# Patient Record
Sex: MALE | Race: WHITE
[De-identification: names, ages, dates, MRNs, and addresses within clinical notes are randomized per-mention and may not be internally consistent; named-entity substitution may affect disease eponyms.]

---

## 2018-03-13 ENCOUNTER — HOSPITAL ENCOUNTER (INPATIENT)
Dept: HOSPITAL 25 - ED | Age: 83
LOS: 1 days | DRG: 208 | End: 2018-03-14
Attending: INTERNAL MEDICINE | Admitting: HOSPITALIST
Payer: MEDICARE

## 2018-03-13 DIAGNOSIS — R40.2433: ICD-10-CM

## 2018-03-13 DIAGNOSIS — Z87.891: ICD-10-CM

## 2018-03-13 DIAGNOSIS — G93.1: ICD-10-CM

## 2018-03-13 DIAGNOSIS — Z79.899: ICD-10-CM

## 2018-03-13 DIAGNOSIS — Z88.8: ICD-10-CM

## 2018-03-13 DIAGNOSIS — N17.0: ICD-10-CM

## 2018-03-13 DIAGNOSIS — G40.909: ICD-10-CM

## 2018-03-13 DIAGNOSIS — K21.9: ICD-10-CM

## 2018-03-13 DIAGNOSIS — E87.2: ICD-10-CM

## 2018-03-13 DIAGNOSIS — J69.0: ICD-10-CM

## 2018-03-13 DIAGNOSIS — E87.4: ICD-10-CM

## 2018-03-13 DIAGNOSIS — J96.01: ICD-10-CM

## 2018-03-13 DIAGNOSIS — F32.9: ICD-10-CM

## 2018-03-13 DIAGNOSIS — I46.9: ICD-10-CM

## 2018-03-13 DIAGNOSIS — J44.9: ICD-10-CM

## 2018-03-13 DIAGNOSIS — J96.02: Primary | ICD-10-CM

## 2018-03-13 DIAGNOSIS — I10: ICD-10-CM

## 2018-03-13 DIAGNOSIS — Z79.01: ICD-10-CM

## 2018-03-13 DIAGNOSIS — Z66: ICD-10-CM

## 2018-03-13 DIAGNOSIS — Z95.2: ICD-10-CM

## 2018-03-13 DIAGNOSIS — E87.5: ICD-10-CM

## 2018-03-13 LAB
BASOPHILS # BLD AUTO: 0 10^3/UL (ref 0–0.2)
BASOPHILS # BLD AUTO: 0 10^3/UL (ref 0–0.2)
EOSINOPHIL # BLD AUTO: 0 10^3/UL (ref 0–0.6)
EOSINOPHIL # BLD AUTO: 0 10^3/UL (ref 0–0.6)
HCT VFR BLD AUTO: 45 % (ref 42–52)
HCT VFR BLD AUTO: 47 % (ref 42–52)
HGB BLD-MCNC: 14.7 G/DL (ref 14–18)
HGB BLD-MCNC: 15.7 G/DL (ref 14–18)
INR PPP/BLD: 1.13 (ref 0.77–1.02)
LYMPHOCYTES # BLD AUTO: 0.9 10^3/UL (ref 1–4.8)
LYMPHOCYTES # BLD AUTO: 2 10^3/UL (ref 1–4.8)
MCH RBC QN AUTO: 35 PG (ref 27–31)
MCH RBC QN AUTO: 35 PG (ref 27–31)
MCHC RBC AUTO-ENTMCNC: 33 G/DL (ref 31–36)
MCHC RBC AUTO-ENTMCNC: 33 G/DL (ref 31–36)
MCV RBC AUTO: 106 FL (ref 80–94)
MCV RBC AUTO: 108 FL (ref 80–94)
MONOCYTES # BLD AUTO: 0.8 10^3/UL (ref 0–0.8)
MONOCYTES # BLD AUTO: 1.1 10^3/UL (ref 0–0.8)
NEUTROPHILS # BLD AUTO: 10 10^3/UL (ref 1.5–7.7)
NEUTROPHILS # BLD AUTO: 5.1 10^3/UL (ref 1.5–7.7)
NRBC # BLD AUTO: 0 10^3/UL
NRBC # BLD AUTO: 0 10^3/UL
NRBC BLD QL AUTO: 0.1
NRBC BLD QL AUTO: 0.3
PLATELET # BLD AUTO: 104 10^3/UL (ref 150–450)
PLATELET # BLD AUTO: 112 10^3/UL (ref 150–450)
RBC # BLD AUTO: 4.17 10^6/UL (ref 4–5.4)
RBC # BLD AUTO: 4.43 10^6/UL (ref 4–5.4)
WBC # BLD AUTO: 11.7 10^3/UL (ref 3.5–10.8)
WBC # BLD AUTO: 8.2 10^3/UL (ref 3.5–10.8)

## 2018-03-13 PROCEDURE — 87205 SMEAR GRAM STAIN: CPT

## 2018-03-13 PROCEDURE — 94002 VENT MGMT INPAT INIT DAY: CPT

## 2018-03-13 PROCEDURE — 71045 X-RAY EXAM CHEST 1 VIEW: CPT

## 2018-03-13 PROCEDURE — 85610 PROTHROMBIN TIME: CPT

## 2018-03-13 PROCEDURE — 82550 ASSAY OF CK (CPK): CPT

## 2018-03-13 PROCEDURE — 84484 ASSAY OF TROPONIN QUANT: CPT

## 2018-03-13 PROCEDURE — 87899 AGENT NOS ASSAY W/OPTIC: CPT

## 2018-03-13 PROCEDURE — 70450 CT HEAD/BRAIN W/O DYE: CPT

## 2018-03-13 PROCEDURE — 82553 CREATINE MB FRACTION: CPT

## 2018-03-13 PROCEDURE — 82803 BLOOD GASES ANY COMBINATION: CPT

## 2018-03-13 PROCEDURE — 36600 WITHDRAWAL OF ARTERIAL BLOOD: CPT

## 2018-03-13 PROCEDURE — 36415 COLL VENOUS BLD VENIPUNCTURE: CPT

## 2018-03-13 PROCEDURE — 80076 HEPATIC FUNCTION PANEL: CPT

## 2018-03-13 PROCEDURE — C8929 TTE W OR WO FOL WCON,DOPPLER: HCPCS

## 2018-03-13 PROCEDURE — 82140 ASSAY OF AMMONIA: CPT

## 2018-03-13 PROCEDURE — 84132 ASSAY OF SERUM POTASSIUM: CPT

## 2018-03-13 PROCEDURE — 99285 EMERGENCY DEPT VISIT HI MDM: CPT

## 2018-03-13 PROCEDURE — 95822 EEG COMA OR SLEEP ONLY: CPT

## 2018-03-13 PROCEDURE — 83880 ASSAY OF NATRIURETIC PEPTIDE: CPT

## 2018-03-13 PROCEDURE — 85730 THROMBOPLASTIN TIME PARTIAL: CPT

## 2018-03-13 PROCEDURE — 85025 COMPLETE CBC W/AUTO DIFF WBC: CPT

## 2018-03-13 PROCEDURE — 87641 MR-STAPH DNA AMP PROBE: CPT

## 2018-03-13 PROCEDURE — 93306 TTE W/DOPPLER COMPLETE: CPT

## 2018-03-13 PROCEDURE — 87077 CULTURE AEROBIC IDENTIFY: CPT

## 2018-03-13 PROCEDURE — 83735 ASSAY OF MAGNESIUM: CPT

## 2018-03-13 PROCEDURE — 93005 ELECTROCARDIOGRAM TRACING: CPT

## 2018-03-13 PROCEDURE — 83605 ASSAY OF LACTIC ACID: CPT

## 2018-03-13 PROCEDURE — 80053 COMPREHEN METABOLIC PANEL: CPT

## 2018-03-13 PROCEDURE — 84100 ASSAY OF PHOSPHORUS: CPT

## 2018-03-13 PROCEDURE — 80048 BASIC METABOLIC PNL TOTAL CA: CPT

## 2018-03-13 PROCEDURE — 5A1945Z RESPIRATORY VENTILATION, 24-96 CONSECUTIVE HOURS: ICD-10-PCS | Performed by: INTERNAL MEDICINE

## 2018-03-13 PROCEDURE — 87502 INFLUENZA DNA AMP PROBE: CPT

## 2018-03-13 PROCEDURE — 87040 BLOOD CULTURE FOR BACTERIA: CPT

## 2018-03-13 PROCEDURE — 94003 VENT MGMT INPAT SUBQ DAY: CPT

## 2018-03-13 RX ADMIN — ACETAMINOPHEN PRN MG: 650 SUPPOSITORY RECTAL at 17:22

## 2018-03-13 RX ADMIN — HEPARIN SODIUM SCH UNITS: 5000 INJECTION INTRAVENOUS; SUBCUTANEOUS at 13:45

## 2018-03-13 RX ADMIN — CHLORHEXIDINE GLUCONATE 0.12% ORAL RINSE SCH: 1.2 LIQUID ORAL at 23:34

## 2018-03-13 RX ADMIN — CHLORHEXIDINE GLUCONATE 0.12% ORAL RINSE SCH ML: 1.2 LIQUID ORAL at 23:53

## 2018-03-13 RX ADMIN — SODIUM CHLORIDE SCH MLS/HR: 900 IRRIGANT IRRIGATION at 23:54

## 2018-03-13 RX ADMIN — PIPERACILLIN AND TAZOBACTAM SCH MLS/HR: 3; .375 INJECTION, POWDER, LYOPHILIZED, FOR SOLUTION INTRAVENOUS; PARENTERAL at 11:51

## 2018-03-13 RX ADMIN — HEPARIN SODIUM SCH UNITS: 5000 INJECTION INTRAVENOUS; SUBCUTANEOUS at 23:53

## 2018-03-13 RX ADMIN — CHLORHEXIDINE GLUCONATE 0.12% ORAL RINSE SCH ML: 1.2 LIQUID ORAL at 08:58

## 2018-03-13 RX ADMIN — CHLORHEXIDINE GLUCONATE 0.12% ORAL RINSE SCH ML: 1.2 LIQUID ORAL at 13:45

## 2018-03-13 RX ADMIN — PANTOPRAZOLE SODIUM SCH MG: 40 INJECTION, POWDER, FOR SOLUTION INTRAVENOUS at 08:58

## 2018-03-13 RX ADMIN — ACETAMINOPHEN PRN MG: 650 SUPPOSITORY RECTAL at 11:54

## 2018-03-13 RX ADMIN — SODIUM CHLORIDE SCH MLS/HR: 900 IRRIGANT IRRIGATION at 13:55

## 2018-03-13 RX ADMIN — SODIUM CHLORIDE SCH MLS/HR: 900 IRRIGANT IRRIGATION at 06:17

## 2018-03-13 RX ADMIN — CHLORHEXIDINE GLUCONATE 0.12% ORAL RINSE SCH ML: 1.2 LIQUID ORAL at 17:22

## 2018-03-13 NOTE — RAD
INDICATION: Short of breath     



COMPARISON: None

 

TECHNIQUE: An AP portable view obtained at 0334 hours is submitted.



FINDINGS: 



Bones/Soft Tissues: There are no acute bony findings. There is an endotracheal tube in

satisfactory position There is sternotomy. There are overlying external pacemaker pads



Cardiomediastinal: The correct silhouette is enlarged.. 



Lungs: There is interstitial and alveolar infiltrative change which may represent

pulmonary edema.



Pleura: There is a left-sided pleural effusion.. 



Other: None



IMPRESSION:  ENDOTRACHEAL TUBE IN SATISFACTORY POSITION. SUSPECT PULMONARY EDEMA.

## 2018-03-13 NOTE — ECHO
Patient:      LATANYA STEWART

Med Rec#:     [id number]            :          1934          

Date:         2018            Age:          83y                 

Account#:     [id number]          Height:       193.04 cm / 76.0 in

Accession#:   [id number]           Weight:       129.73 kg / 285.9 lbs

Sex:          M                     BSA:          2.58

Room#:        [facility]                

Admit Date#:  2018          

Type:         Inpatient

 

Referring:    Fred Frankenberg,MD

Reading:      Jeramie Perez MD

Sonographer:  Magalys Milton RDCS

CC:           Joni Siegel MD

______________________________________________________________________

 

Transthoracic Echocardiogram

 

Indication:

Cardiac arrest, history of tissue AVR and MVR

BP:           180/101

HR:           83

Rhythm:       NSR

 

Findings     

History:

Seizure disorder, s/p bioprosthetic AVR/MVR, HTN, GERD, ROGELIO with CPAP.

Patient was sedated, intubated and mechanically ventillated during the

study.  

 

Technical Comments:

The study is technically difficult.  The study is technically limited

due to poor acoustic windows.  The study is technically limited due to

patient being intubated and on a ventilator. Completed at 0900. 

 

Left Ventricle:

The left ventricular chamber size is normal. Mild concentric left

ventricular hypertrophy is observed. Global left ventricular wall motion

and contractility are within normal limits. There is normal left

ventricular systolic function. The estimated ejection fraction is

60-65%. Contrast used to improve assessment. Abnormal left ventricular

diastolic function is observed. Abnormal left ventricular diastolic

filling is observed, consistent with impaired relaxation.  The patient

was unable to perform a Valsalva maneuver. 

 

Left Atrium:

The left atrium is not well visualized. 

 

Right Ventricle:

The right ventricle is not well visualized. 

 

Right Atrium:

The right atrium is not well visualized. 

 

Aortic Valve:

The aortic valve structure is not well visualized. A bio-prosthetic

aortic valve is present. Accurate assessment of valvualr function is

suboptimal due to poor images. 

 

Mitral Valve:

The mitral valve structure is not well visualized. A bioprosthetic

mitral valve is present.  The bioprosthetic mitral valve appears to be

functioning normally.  

 

Tricuspid Valve:

The tricuspid valve structure is not well visualized. There is trace

tricuspid regurgitation.  Unable to estimate the right ventricular

systolic pressure.   There is no tricuspid stenosis. 

 

Pulmonic Valve:

The pulmonic valve structure is not well visualized. There is no

pulmonic stenosis.  

 

Pericardium:

There is no significant pericardial effusion. 

 

Aorta:

There is no dilatation of the ascending aorta. There is no dilatation of

the aortic arch. The aortic root is normal in size. 

 

Pulmonary Artery:

The main pulmonary artery is not well visualized. 

 

Venous:

Unable to accurately comment on the size  collapsibility of the IVC as

the patient in known to be on mechanical ventilation. 

 

Contrast:

Definity was used to optimize study.  4 mL of diluted Definity was

utilized. Intravenous contrast was used to enhance endocardial border

definition. 

 

Conclusions

The study is technically difficult. 

The study is technically limited due to poor acoustic windows.

Accurate valvular assessment is suboptimal due to poor quality images. 

The study is technically limited due to patient being intubated and on a

ventilator. 

Mild concentric left ventricular hypertrophy is observed.

Global left ventricular wall motion and contractility are within normal

limits.

The estimated ejection fraction is 60-65%. Contrast used to improve

assessment.

There is trace tricuspid regurgitation. 

Unable to estimate the right ventricular systolic pressure.  

No reports of prior studies are offered for comparison.

 

Measurements     

Name                    Value         Normal Range            

RVIDd (AP) 2D           4 cm          (0.9 - 2.6)             

RVDdMajor (2D)          5.5 cm        (2.2 - 4.4)             

RAd ISD 4CH             7.3 cm        (3.4 - 4.9)             

RA (A4C)W               5.3 cm        (2.9 - 4.6)             

IVSd (2D)               1.3 cm        (0.6 - 1)               

LVPWd (2D)              1.2 cm        (0.6 - 1)               

LVIDd (2D)              4.4 cm        (3.6 - 5.4)             

LVIDs (2D)              3.3 cm        -                        

LV FS (2D)              25 %          (25 - 45)               

Aortic Annulus          2 cm          (1.4 - 2.6)             

Ao root diameter (2D)   3.1 cm        (2.1 - 3.5)             

Ascending Ao            3 cm          (2.1 - 3.4)             

Aortic arch             2.8 cm        (1.8 - 3.4)             

LA dimension (AP) 2D    3.6 cm        (2.3 - 3.8)             

LAd ISD 4CH             7.1 cm        (2.9 - 5.3)             

LA ISD 4CH W            5.7 cm        (2.5 - 4.5)             

 

Name                    Value         Normal Range            

LA ESV SP 4CH (A/L)     133 ml        -                        

LA ESV SP 2CH (A/L)     148 ml        -                        

LA ESV BP (A/L)         143 ml        -                        

LA ESV BP (A/L) index   55 ml/m2      -                        

LA ESV SP 4CH (MOD)     125 ml        -                        

LA ESV SP 2CH (MOD)     142 ml        -                        

 

Name                    Value         Normal Range            

MV E-wave Vmax          1.29 m/sec    -                        

MV deceleration time    183.56 msec   -                        

MV A-wave Vmax          1.55 m/sec    -                        

MV E:A ratio            0.83 ratio    -                        

LV septal e' Vmax       0.06 m/sec    -                        

LV lateral e' Vmax      0.1 m/sec     -                        

LV E:e' septal ratio    21.5 ratio    -                        

LV E:e' lateral ratio   12.9 ratio    -                        

 

Name                    Value         Normal Range            

AV Vmax                 0.91 m/sec    -                        

AV VTI                  24.3 cm       -                        

AV peak gradient        3.35 mmHg     -                        

AV mean gradient        2.02 mmHg     -                        

LVOT Vmax               0.81 m/sec    -                        

LVOT VTI                16.9 cm       -                        

LVOT peak gradient      2.64 mmHg     -                        

LVOT mean gradient      1.35 mmHg     -                        

RU Vmax                0.8 m/sec     -                        

 

Name                    Value         Normal Range            

MV Vmax                 1.9 m/sec     -                        

MV VTI                  41.3 cm       -                        

MV peak gradient        14.44 mmHg    -                        

MV mean gradient        7.78 mmHg     -                        

MV PHT                  100.7 msec    -                        

MVA (PHT)               2.18 cm2      -                        

 

Name                    Value         Normal Range            

TR Vmax                 2.5 m/sec     -                        

TR peak gradient        25 mmHg       -                        

 

Name                    Value         Normal Range            

PV Vmax                 0.8 m/sec     -                        

PV peak gradient        2.58 mmHg     -                        

 

Electronically signed by: Jeramie Perez MD on 2018 09:38:32

## 2018-03-13 NOTE — ED
Maribel ZHANG Julia, scribed for Gregg West MD on 03/13/18 at 0310 .





Cardiac Resuscitation





- HPI Summary


HPI Summary: 





This patient is a 83 year old M BIBA to [facility] with a down time of 1 hour prior 

to receiving care by EMS. EMS reports patient was asystole at first but then V-

fib. EMS treatment of six rounds of Epinephrine, one of Sodium bicarbonate, and 

two rounds of Narcan. EMS intubated patient with a 7.0 tube.





- History of Current Complaint


Stated Complaint: CARDIAC ISSUE


Time Seen by Provider: 03/13/18 02:59


Hx Obtained From: EMS


Hx From Patient Unobtainable Due To: Other - Patient is unresponsive


Down-time Before Basic Life Support Initiated: Down-time before BLS initiated: 

- one hour





- Allergies/Home Medications


Allergies/Adverse Reactions: 


 Allergies











Allergy/AdvReac Type Severity Reaction Status Date / Time


 


MS Hydrochlorothiazide Allergy Intermediate Unknown Unverified 04/16/14 08:48





[Hydrochlorothiazide]   Reaction  





   Details  














- Past Medical History


Past Medical History: Other: - COPD, skin cancer, enlarged prostate, stomach 

anurysm, CHF, valve replacement HTN, atrial flutter, seizures, depression, 

cataract extraction. Obtained by Medical Records





- Family History


Family History: Unobtainable Due to Extremis





- Social History


Social History: Unobtainable Due to Extremis





- Review of Systems


Review of Systems: Unobtainable Due to Extremis





Physical Examination





- Summary


Physical Exam Summary: 





VITAL SIGNS: Reviewed.


GENERAL:  Patient is a well-developed and nourished male who is lying in the 

stretcher. 


HEAD AND FACE: No signs of trauma. No ecchymosis, hematomas or skull 

depressions. 


EYES: No corneal reflex. Pupils fixated and dilated.


EARS: Ears grossly intact. 


MOUTH: Patient is intubated


NECK: Supple, trachea is midline, no adenopathy, no JVD, no carotid bruit, 


CHEST: Symmetric, 


LUNGS: Clear to auscultation bilaterally. No wheezing or crackles.


CVS: Regular rate and rhythm, S1 and S2 present, no murmurs or gallops 

appreciated.


ABDOMEN: Soft. Abdominal distention. No rebound no guarding, and no masses 

palpated. Bowel sounds are normal.


EXTREMITIES:, no edema, no cyanosis or clubbing. IO in R leg.


NEURO: No acute neurological deficits. 


SKIN: Dry and warm








Procedures





- Procedure Summary


Procedure Summary: 





Post intubation ABG, at 04:10, due to respiratory acidosis. Ventilation 

settings changed to AC [id number]. Respiratory rate of 20. SaO2 60%. 





Diagnostics





- Laboratory


Result Diagrams: 


 03/13/18 03:00





 03/13/18 03:00


Lab Statement: Any lab studies that have been ordered have been reviewed, and 

results considered in the medical decision making process.





- Radiology


  ** CXR


Radiology Interpretation Completed By: ED Physician - Pumonary Edema. PNA over 

R lung.





- EKG


  ** 0342


Cardiac Rate: NL


EKG Rhythm: Sinus Rhythm - at 77 BPM


EKG Interpretation: First degree AV block, nonspecific T wave changes in the 

inferior leads





Cardiac Resus. Course/Dx





- Course


Course Of Treatment: Pako is brought in by EMS with a down time of 1 hour 

prior to receiving care by EMS. EMS reports patient was asystole at first but 

then V-fib. EMS treatment of six rounds of Epinephrine, one of Sodium 

bicarbonate, and two rounds of Narcan. EMS intubated patient with a 7.0 tube. 

Patient given second round of sodium bicarbonate in the ED. Patient is given 

Piperacillin and Vancomycin. CXR reveals pulmonary edema and PNA in the right 

lung. Bloodwork reveals troponin of 0.06 and lactic acid of 9.2. ABG reveals ph 

of 6.96 and pCO2 of 93. Idalmis is admitted by Dr. Frankenberg.





- Diagnoses


Provider Diagnoses: 


 CHF (congestive heart failure), PNA (pneumonia), Cardiac arrest








- Provider Notifications


Discussed Care Of Patient With: Fred Frankenberg - hospitalist


Time Discussed With Above Provider: 03:09


Instructed by Provider To: Admit As Inpatient





- Critical Care Time


Critical Care Time: 30-74 min





Discharge





- Discharge Plan


Condition: Critical


Disposition: ADMITTED TO [facility]


Referrals: 


Joni Siegel MD [Primary Care Provider] - 





The documentation as recorded by the Maribel britt Julia accurately reflects 

the service I personally performed and the decisions made by me, Gregg West MD.

## 2018-03-13 NOTE — RAD
Indication: Cardiac arrest. Question anoxic brain injury.



Comparison: No relevant prior exams available on the [facility] PACS for comparison.



Technique: Noncontrast CT vertex of skull through foramen magnum.



Report: Mild motion artifact. Unremarkable cerebral sulci, ventricles, and basal cisterns.

Decreased density in the periventricular and subcortical white matter while non-specific

is most likely due to chronic microangiopathy. Negative for gray matter white matter

obscuration, intra or extra-axial hemorrhage, or mass effect.



Negative for calvarial or skull base fracture. Fluid level with gas bubbles at the RIGHT

maxillary sinus. Clear mastoid air spaces.



Negative for scalp hematoma.



IMPRESSION: 

1. No acute intracranial process evident.

2. Decreased density in the periventricular and subcortical white matter while

non-specific is most likely due to chronic microangiopathy. 

3. Correlate for potential acute RIGHT maxillary sinusitis.

## 2018-03-13 NOTE — HP
H&P (Free Text)


History and Physical: 





PCP: DAYSI Siegel MD





Date/Time: 03/13/2018 0400





CC: out of hospital cardiac arrest





HPI: Mr Murray is an 82YO male who experienced an out-of-hospital arrest 

in his home. Per his wife, they were arguing at ~0100 when he got up, went to 

the kitchen briefly, and returned to sit on the edge of the bed. He then slid 

off the bed and was unable to get up even with her assistance. She gave him a 

pillow and was planning on letting him sleep in the floor until she could get 

more help in the morning. She did call her daughter who arrived about an hour 

after he slid off the bed. She found him unresponsive and 'blue' and called 

EMS. EMS arrived and performed ACLS protocols for ~40 minutes, reportedly with 

unsustained ROSC multiple times. They called medical control at [facility] who 

advised cessation. They then administered naloxone obtained sustained ROSC and 

was able to transport. At this time, Mr Murray is intubated and unable to 

give any history. He is on no sedatives and tolerating the vent well. No 

corneal or pupillary reflex.





Of note, over the past 2 weeks Mr Murray has developed new onset of 

recurrent throbbing headaches of which he was suffering tonight prior to the 

event. Family seems at times realistic of his prognosis, but then alludes to 

the fact he was in a coma for 4 weeks following one of his valve replacements. 

They do agree to DNR status at this time & a MOLST to that effect was filled 

out.





PMedHx


seizure disorder


tissue AVR/MVR


HTN


GERD


depression





Ambulatory Orders


Nursing to reconcile.





Amiodarone TAB* [Cordarone Tab*] 200 mg PO DAILY 04/03/14 


Carvedilol TAB* [Coreg TAB*] 3.125 mg PO BID 04/03/14 


Furosemide TAB* [Lasix TAB*] 20 mg PO DAILY PRN 04/03/14 


Losartan Potassium 50 mg PO DAILY 04/03/14 


Omeprazole CAP* [Prilosec CAP*] 40 mg PO DAILY 04/03/14 


Sertraline* [Zoloft*] 100 mg PO DAILY 04/03/14 


Spironolactone TAB* [Aldactone TAB*] 25 mg PO DAILY 04/03/14 


Warfarin TAB(*) [Coumadin TAB(*)] 12 mg PO 0700 04/03/14 


carBAMazepine TAB(*) [Tegretol TAB(*)] 200 mg PO BID 04/03/14 





Allergies


MS Hydrochlorothiazide [Hydrochlorothiazide] Allergy (Intermediate, Unverified 

04/16/14 08:48)


 Unknown Reaction Details


 UNKNOWN 





PSurgHx


tissue AVR/MVR


OU cat


TURP





SocHx: quit >50 years ago, 3-4 alcoholic drinks daily, no recreational drugs; 

lives with his wife; retired ; DNR code status





FamHx: reviewed & non-contributory to presentation





ROS: as above, otherwise reviewed and all were negative





vitals: 


 Vital Signs











Temp  36.4 C   03/13/18 02:55


 


Pulse  76   03/13/18 02:55


 


Resp  16   03/13/18 02:55


 


BP  121/80   03/13/18 02:55


 


Pulse Ox  100   03/13/18 02:55








Constitutional: NAD, normally developed, obese elderly white male


HEENM: atraumatic; sclera/conjunctiva: anicteric/clear; blephara: normal; 

hearing: unable to assess; oropharynx: intubated, mucosa tacky


Neck: soft tissue: non-tender, no mass; thyroid: normal


Pulmonary: clear to auscultation bilaterally, good aeration, no accessory 

muscle use


CV: RR/RR, normal S1S2, no carotid bruit, no jugular venous distention, 2+ B DP/

PT, no edema


Abdominal: soft, non-distended, non-tender, no rebound/guarding/rigidity, 

normoactive bowel sounds, no hepatosplenomegaly or masses, no costovertebral 

angle tenderness


Musculoskeletal: general: grossly intact, non-tender to palpation; gait: 

currently non-ambulatory


Integumental: normal appearance and texture of exposed skin for age





Neurological


cranial nerves


II: unable to assess visual fields


III/IV/VI: light reflex, pupils 3mm/fixed B


V: absent corneal reflex


VII: intact facial symmetry


VIII: unable to assess hearing


IX/X: absent gag reflex


XII: unable to assess tongue protrusion or voice articulation 





motor


no spontaneous movement, no response to noxious stimuli





coordination 


finger/nose: unable to assess


heal/shin: unable to assess


dysdiadochokinesia: unable to assess





sensory 


crude touch: no response to noxious stimuli


pinprick: unable to assess


vibration: unable to assess


proprioception: unable to assess





Psychiatric 


orientation: GCS 3


affect: flat


mood: acquiescent


eye contact: absent


content: absent


responses: absent


insight: absent





Testing: 


 Lab Results











  03/13/18 03/13/18 03/13/18 Range/Units





  03:00 03:00 03:00 


 


WBC   8.2   (3.5-10.8)  10^3/ul


 


RBC   4.17   (4.0-5.4)  10^6/ul


 


Hgb   14.7   (14.0-18.0)  g/dl


 


Hct   45   (42-52)  %


 


MCV   108 H   (80-94)  fL


 


MCH   35 H   (27-31)  pg


 


MCHC   33   (31-36)  g/dl


 


RDW   14   (10.5-15)  %


 


Plt Count   104 L   (150-450)  10^3/ul


 


MPV   10   (7.4-10.4)  um3


 


Neut % (Auto)   61.8   (38-83)  %


 


Lymph % (Auto)   23.8 L   (25-47)  %


 


Mono % (Auto)   13.5 H   (0-7)  %


 


Eos % (Auto)   0.5   (0-6)  %


 


Baso % (Auto)   0.4   (0-2)  %


 


Absolute Neuts (auto)   5.1   (1.5-7.7)  10^3/ul


 


Absolute Lymphs (auto)   2.0   (1.0-4.8)  10^3/ul


 


Absolute Monos (auto)   1.1 H   (0-0.8)  10^3/ul


 


Absolute Eos (auto)   0   (0-0.6)  10^3/ul


 


Absolute Basos (auto)   0   (0-0.2)  10^3/ul


 


Absolute Nucleated RBC   0   10^3/ul


 


Nucleated RBC %   0.1   


 


Macrocytosis   1+   


 


INR (Anticoag Therapy)     (0.77-1.02)  


 


Patient Temperature     


 


ABG pH     (7.35-7.45)  


 


ABG pH (Temp Correct)     


 


ABG pCO2     (35-45)  mmHg


 


ABG pCO2 (Temp Corrct     


 


ABG pO2     ()  mmHg


 


ABG pO2 (Temp Correct     


 


ABG HCO3     (19-31)  mmol/L


 


ABG O2 Saturation     (95-98)  %


 


ABG Base Excess     (-2.0-2.0)  


 


Respiration Rate     


 


O2 Delivery Device     


 


Ventilator Type     


 


Vent Mode     


 


FiO2     


 


Inspiratory Time     


 


PEEP     


 


Pressure Support     


 


Pressure Control     


 


EPAP     


 


IPAP     


 


BiPAP     


 


Sodium    134  (133-145)  mmol/L


 


Potassium    4.2  (3.5-5.0)  mmol/L


 


Chloride    98 L  (101-111)  mmol/L


 


Carbon Dioxide    21 L  (22-32)  mmol/L


 


Anion Gap    15 H  (2-11)  mmol/L


 


BUN    14  (6-24)  mg/dL


 


Creatinine    1.56 H  (0.67-1.17)  mg/dL


 


Est GFR ( Amer)    54.9  (>60)  


 


Est GFR (Non-Af Amer)    42.7  (>60)  


 


BUN/Creatinine Ratio    9.0  (8-20)  


 


Glucose    179 H  ()  mg/dL


 


Lactic Acid     (0.5-2.0)  mmol/L


 


Calcium    9.0  (8.6-10.3)  mg/dL


 


Magnesium    2.6  (1.9-2.7)  mg/dL


 


Total Bilirubin    0.60  (0.2-1.0)  mg/dL


 


AST    68 H  (13-39)  U/L


 


ALT    43  (7-52)  U/L


 


Alkaline Phosphatase    117 H  ()  U/L


 


Total Creatine Kinase    242 H  ()  U/L


 


CK-MB (CK-2)    3.1  (0.6-6.3)  ng/mL


 


Troponin I    0.06 H*  (<0.04)  ng/mL


 


B-Natriuretic Peptide  534 H   ( - 100) pg/mL


 


Total Protein    7.3  (6.4-8.9)  g/dL


 


Albumin    3.6  (3.2-5.2)  g/dL


 


Globulin    3.7  (2-4)  g/dL


 


Albumin/Globulin Ratio    1.0  (1-3)  














  03/13/18 03/13/18 03/13/18 Range/Units





  03:00 03:00 03:40 


 


WBC     (3.5-10.8)  10^3/ul


 


RBC     (4.0-5.4)  10^6/ul


 


Hgb     (14.0-18.0)  g/dl


 


Hct     (42-52)  %


 


MCV     (80-94)  fL


 


MCH     (27-31)  pg


 


MCHC     (31-36)  g/dl


 


RDW     (10.5-15)  %


 


Plt Count     (150-450)  10^3/ul


 


MPV     (7.4-10.4)  um3


 


Neut % (Auto)     (38-83)  %


 


Lymph % (Auto)     (25-47)  %


 


Mono % (Auto)     (0-7)  %


 


Eos % (Auto)     (0-6)  %


 


Baso % (Auto)     (0-2)  %


 


Absolute Neuts (auto)     (1.5-7.7)  10^3/ul


 


Absolute Lymphs (auto)     (1.0-4.8)  10^3/ul


 


Absolute Monos (auto)     (0-0.8)  10^3/ul


 


Absolute Eos (auto)     (0-0.6)  10^3/ul


 


Absolute Basos (auto)     (0-0.2)  10^3/ul


 


Absolute Nucleated RBC     10^3/ul


 


Nucleated RBC %     


 


Macrocytosis     


 


INR (Anticoag Therapy)   1.13 H   (0.77-1.02)  


 


Patient Temperature    Not Reportable  


 


ABG pH    6.96 L*  (7.35-7.45)  


 


ABG pH (Temp Correct)    Not Reportable  


 


ABG pCO2    93 H*  (35-45)  mmHg


 


ABG pCO2 (Temp Corrct    Not Reportable  


 


ABG pO2    182 H  ()  mmHg


 


ABG pO2 (Temp Correct    Not Reportable  


 


ABG HCO3    14.5 L  (19-31)  mmol/L


 


ABG O2 Saturation    99.4 H  (95-98)  %


 


ABG Base Excess    -13.3 L  (-2.0-2.0)  


 


Respiration Rate    16  


 


O2 Delivery Device    Vent  


 


Ventilator Type    400  


 


Vent Mode    Cmv  


 


FiO2    100  


 


Inspiratory Time    Not Reportable  


 


PEEP    5  


 


Pressure Support    Not Reportable  


 


Pressure Control    Not Reportable  


 


EPAP    Not Reportable  


 


IPAP    Not Reportable  


 


BiPAP    Not Reportable  


 


Sodium     (133-145)  mmol/L


 


Potassium     (3.5-5.0)  mmol/L


 


Chloride     (101-111)  mmol/L


 


Carbon Dioxide     (22-32)  mmol/L


 


Anion Gap     (2-11)  mmol/L


 


BUN     (6-24)  mg/dL


 


Creatinine     (0.67-1.17)  mg/dL


 


Est GFR ( Amer)     (>60)  


 


Est GFR (Non-Af Amer)     (>60)  


 


BUN/Creatinine Ratio     (8-20)  


 


Glucose     ()  mg/dL


 


Lactic Acid  9.2 H*    (0.5-2.0)  mmol/L


 


Calcium     (8.6-10.3)  mg/dL


 


Magnesium     (1.9-2.7)  mg/dL


 


Total Bilirubin     (0.2-1.0)  mg/dL


 


AST     (13-39)  U/L


 


ALT     (7-52)  U/L


 


Alkaline Phosphatase     ()  U/L


 


Total Creatine Kinase     ()  U/L


 


CK-MB (CK-2)     (0.6-6.3)  ng/mL


 


Troponin I     (<0.04)  ng/mL


 


B-Natriuretic Peptide    ( - 100) pg/mL


 


Total Protein     (6.4-8.9)  g/dL


 


Albumin     (3.2-5.2)  g/dL


 


Globulin     (2-4)  g/dL


 


Albumin/Globulin Ratio     (1-3)  








ECG, personally reviewed: NSR rate 77, no ischemia





CXR, personally reviewed: R-sided white out & LLL infiltrate, suspect 

aspiration as R>>L vs pulmonary contusion 2nd prolonged CPR





CT brain WO, personally reviewed: reported as negative





Impression: 83M presenting with out-of-hospital arrest of uncertain etiology 

with ~40minutes of ACLS prior to achieving sustained ROSC





DIAGNOSIS & PLAN


Primary 


out-of-hospital arrest


: ICU


: mechanical ventilation


: sedation, as indicated/needed


: KAMLA Gibbs MD critical care apprised, agreed with plan of care


: prognosis poor, family aware


: supportive care





suspect anoxic brain injury


: seizure precautions


: recheck CT brain WO 24-48h





aspiration pneumonia vs pulmonary contusion 2nd prolonged CPR


: IV piperacillin/tazobactam


: blood & sputum CXs





Secondary 


seizure disorder


: review meds once reconciled





tissue AVR/MVR


: no acute issues





HTN


: review meds once reconciled





GERD


: IV pantoprazole





depression


: review meds once reconciled





Admission Rational: inpatient for ICU monitoring and management of patient at 

very high risk of morbidity/mortality; inappropriate for outpatient setting


DVTp: SCDs & heparin SQ (if no ICH)


Code Status: DNR, MOLST filled out


HCP: wife

## 2018-03-13 NOTE — PN
Progress Note





- Progress Note


Date of Service: 03/13/18


Note: 





CRITICAL CARE MEDICINE


Date: 3/13/18  Time: 1210





SUBJECTIVE: Patient seen and examined. family updated at bedside. 





PHYSICAL EXAM:


Vital Signs: Reviewed. 


Neurologic: no response to pain, verbal. nonreactive pupils, fixed. no 

corneals. mild cough. overbreathing vent. 


HEENT: pupils equal. Sclera anicteric. Trachea midline. 7.0 ett


Cardiovascular: distant, S1 S2, scar


Respiratory: coarse rales L>R bl; rib fx. no crepitance other then ribs. 


Abdomen: Soft, obese, nt. 


Extremities: cold. cyanotic distally LE


Access: IO, piv





LABS: Reviewed.


IMAGING: Reviewed.


MEDICATIONS: Reviewed.





ASSESSMENT: 83 M


OHCA - significant down time


concern for anoxic brain injury


coma on admission


Severe metabolic and resp acidosis on admission


lactic acidosis on admission


hyperkalemia


h/o valvular heart dz; chronic anticoagulation





PLAN:


Neurologic: concern for anoxic brain injury. support perfusion. can check eeg 

on off chance for seizures and eval baseline in this acute phase of post 

cardiac arrest coma.  time. TTM 36C for first 72hrs. outcome looking poor but 

time at present. 


Cardiovascular: bp holding without pressors. IVF. echo looked ok. 


Respiratory: ? if primary resp arrest. cxr with diastolic failure +/- 

aspiration and ali. almost flail chest now. changed to aprv to see if we can 

recruit as on 100% apv. wean O2 to maintain sat >94%. 


Gastrointestinal: ogt. sup. hold off tf for now. f/u lfts


Renal/Metabolic: acidosis improving. crystalloids. f/u uout. k ok and coming 

down post acid load. 


Infectious Disease: on zosyn for now and continued.


Hematology: stable. hsq


Endocrine: doubt steroids would benefit and can hold off for now. f/u bg. 


Musculoskeletal: f/u chronic le stasis and skin care


Psych/Social: d/w pts wife and daughter at bedside. They expressed good 

understanding. they even had asked ems if they could let him go. Explained 

support plan at the moment. eeg purpose. could consider mri down the line but 

see if his clinical status can guide us.  


if pt unable to improve, or certainly if worsening, DNR and no real escalation 

in care and can consider withdrawal. 





Supportive and preventative care as ordered.


SUP: ppi


VTE prophylaxis: heparin


Ruiz catheter given critical illness, monitoring needs for accurate assessment 

of MIGUEL ANGEL and KDIGO criteria for critically ill patients and to avoid potential 

harms of urinary retention, skin breakdown/ulcers.


Disposition: ICU


Code Status: DNR


Critical Care Time: 45min





ROMMEL Gibbs DO

## 2018-03-14 VITALS — SYSTOLIC BLOOD PRESSURE: 109 MMHG | DIASTOLIC BLOOD PRESSURE: 52 MMHG

## 2018-03-14 LAB
BASOPHILS # BLD AUTO: 0 10^3/UL (ref 0–0.2)
EOSINOPHIL # BLD AUTO: 0 10^3/UL (ref 0–0.6)
HCT VFR BLD AUTO: 43 % (ref 42–52)
HGB BLD-MCNC: 14.1 G/DL (ref 14–18)
INR PPP/BLD: 1.33 (ref 0.77–1.02)
LYMPHOCYTES # BLD AUTO: 1 10^3/UL (ref 1–4.8)
MCH RBC QN AUTO: 35 PG (ref 27–31)
MCHC RBC AUTO-ENTMCNC: 33 G/DL (ref 31–36)
MCV RBC AUTO: 106 FL (ref 80–94)
MONOCYTES # BLD AUTO: 1.6 10^3/UL (ref 0–0.8)
NEUTROPHILS # BLD AUTO: 15.2 10^3/UL (ref 1.5–7.7)
NRBC # BLD AUTO: 0 10^3/UL
NRBC BLD QL AUTO: 0.2
PLATELET # BLD AUTO: 79 10^3/UL (ref 150–450)
RBC # BLD AUTO: 4.05 10^6/UL (ref 4–5.4)
WBC # BLD AUTO: 17.9 10^3/UL (ref 3.5–10.8)

## 2018-03-14 RX ADMIN — SODIUM CHLORIDE SCH MLS/HR: 900 IRRIGANT IRRIGATION at 08:36

## 2018-03-14 RX ADMIN — CHLORHEXIDINE GLUCONATE 0.12% ORAL RINSE SCH ML: 1.2 LIQUID ORAL at 11:40

## 2018-03-14 RX ADMIN — CHLORHEXIDINE GLUCONATE 0.12% ORAL RINSE SCH ML: 1.2 LIQUID ORAL at 08:34

## 2018-03-14 RX ADMIN — PANTOPRAZOLE SODIUM SCH MG: 40 INJECTION, POWDER, FOR SOLUTION INTRAVENOUS at 08:34

## 2018-03-14 RX ADMIN — PIPERACILLIN AND TAZOBACTAM SCH MLS/HR: 3; .375 INJECTION, POWDER, LYOPHILIZED, FOR SOLUTION INTRAVENOUS; PARENTERAL at 11:11

## 2018-03-14 RX ADMIN — CHLORHEXIDINE GLUCONATE 0.12% ORAL RINSE SCH ML: 1.2 LIQUID ORAL at 05:31

## 2018-03-14 RX ADMIN — HEPARIN SODIUM SCH UNITS: 5000 INJECTION INTRAVENOUS; SUBCUTANEOUS at 05:31

## 2018-03-14 NOTE — PN
Progress Note





- Progress Note


Date of Service: 03/14/18


Note: 





CRITICAL CARE MEDICINE


Date: 3/14/18  Time: 1145





SUBJECTIVE: Patient seen and examined. family updated at bedside. 





PHYSICAL EXAM:


Vital Signs: Reviewed. 


Neurologic: no response to pain, verbal. nonreactive pupils, right 4mm, left 2mm

, fixed. no corneals. cough response with decerebrate posturing. overbreathing 

vent. 


HEENT: pupils unequal. Sclera anicteric. Trachea midline. 7.0 ett. bloody 

secretions. 


Cardiovascular: distant, S1 S2, scar


Respiratory: coarse rales bl; rib fx evident. 


Abdomen: Soft, obese, nt. 


Extremities: cool. cyanotic distally LE


Access: piv





LABS: Reviewed.


IMAGING: Reviewed.


MEDICATIONS: Reviewed.





ASSESSMENT: 83 M with msof


OHCA - significant down time


anoxic brain injury


coma on admission


Severe metabolic and resp acidosis on admission


lactic acidosis on admission - still slow to clear with component of ischemic 

hepatitis


ATN


hyperkalemia


h/o valvular heart dz; chronic anticoagulation





PLAN: d/w family at bedside. 


Given dynamcis and neuro status, they expressed good understanding and all in 

agreement to move to comfort care alone. 


Morphine iv, gtt, and terminal extubation to ra. 


spiritual care at bedside. 





Disposition: ICU


Code Status: DNR


Critical Care Time: 35min





ROMMEL Gibbs DO

## 2018-03-14 NOTE — EEG
ELECTROENCEPHALOGRAM REPORT:

 

DATE OF STUDY:  03/13/18 - ROOM #ICU-12

 

LOCATION:  He is in intensive care unit.

 

CLINICAL HISTORY:  Cardiac arrest with prolonged resuscitation.  The patient is 
intubated in the intensive care unit.  Propofol was stopped about 40 minutes 
prior to the beginning of this recording.

 

MEDICATIONS:  Consist of:

1.  Zosyn.

2.  Albuterol.

3.  Heparin.

4.  Pantoprazole.

 

EEG DESCRIPTION:  This 16-channel EEG is remarkable for background rhythms 
consisting of significant muscle artifact particularly in the temporal regions. 
There perhaps is a very low voltage very slow background, but electrographic 
activity is otherwise sparse to none.  There are no periodic discharges, 
epileptiform discharges, or asymmetries.  The patient's eyes are open at the 
end of the recording without change in background as described above.

 

INTERPRETATION:  Severely abnormal EEG because of the apparent absence of 
electrographic activity.  This is not a brain death protocol and should not be 
used for that determination.  There are no epileptiform features in this 
recording.

 

[id number]/CPS #: [id number]

[facility]

## 2018-03-14 NOTE — DS
CRITICAL CARE MEDICINE DISCHARGE SUMMARY


ADMISSION DATE: 3/13/2018


ICU ADMISSION DATE: 3/13/2018


ICU DISCHARGE DATE: 3/14/2018


REFERRING PHYSICIAN: Carmen. 


DIAGNOSIS:


1. Multisystem organ failure. 


2. Out of hospital cardiac arrest. 


3. Anoxic brain injury. 


4. Acute hypoxic and hypercarbic respiratory failure. 


5. Acute tubular necrosis.


6. Lactic acidosis. 


7. Coma on admission. 


8. History of valvular heart disease.  





MEDICATIONS AT DISCHARGE:  None. 





ALLERGIES: Hydrochlorothiazide. 





HOSPITAL COURSE: 83 year old male, began acutely getting weak at home and 

failing ventilation and found cyanotic with EMS summoned and treated for 

pulseless electrical activity with about 40 minutes of down time, admitted for 

stability and care in ICU.  Family had a good grasp of the gravity and signed 

DNR and agreed to no real escalations in care, but planned to maintain on 

ventilation for first 24 hours to see if any hope for receovery.  Remained in 

coma state with multisystem organ failure with poor outcome and family opted 

for comfort measures, with patient extubated and passing at 1:02pm. 





DISPOSITION: . 





ROMMEL Gibbs DO